# Patient Record
Sex: MALE | Race: WHITE | HISPANIC OR LATINO | Employment: FULL TIME | URBAN - METROPOLITAN AREA
[De-identification: names, ages, dates, MRNs, and addresses within clinical notes are randomized per-mention and may not be internally consistent; named-entity substitution may affect disease eponyms.]

---

## 2022-06-03 ENCOUNTER — EMPLOYEE HEALTH (OUTPATIENT)
Dept: OCCUPATIONAL MEDICINE | Facility: CLINIC | Age: 32
End: 2022-06-03
Payer: COMMERCIAL

## 2022-06-03 ENCOUNTER — EH NON-PROVIDER (OUTPATIENT)
Dept: OCCUPATIONAL MEDICINE | Facility: CLINIC | Age: 32
End: 2022-06-03
Payer: COMMERCIAL

## 2022-06-03 DIAGNOSIS — Z02.89 ENCOUNTER FOR OCCUPATIONAL HEALTH ASSESSMENT: Primary | ICD-10-CM

## 2022-06-03 DIAGNOSIS — Z02.89 ENCOUNTER FOR OCCUPATIONAL HEALTH ASSESSMENT: ICD-10-CM

## 2022-06-03 LAB
AMP AMPHETAMINE: NORMAL
BAR BARBITURATES: NORMAL
BZO BENZODIAZEPINES: NORMAL
COC COCAINE: NORMAL
INT CON NEG: NORMAL
INT CON POS: NORMAL
MDMA ECSTASY: NORMAL
MET METHAMPHETAMINES: NORMAL
MTD METHADONE: NORMAL
OPI OPIATES: NORMAL
OXY OXYCODONE: NORMAL
PCP PHENCYCLIDINE: NORMAL
POC URINE DRUG SCREEN OCDRS: NORMAL
THC: NORMAL

## 2022-06-03 PROCEDURE — 8915 PR COMPREHENSIVE PHYSICAL: Performed by: PREVENTIVE MEDICINE

## 2022-06-03 PROCEDURE — 94375 RESPIRATORY FLOW VOLUME LOOP: CPT | Performed by: PREVENTIVE MEDICINE

## 2022-06-03 PROCEDURE — 80305 DRUG TEST PRSMV DIR OPT OBS: CPT | Performed by: PREVENTIVE MEDICINE

## 2022-08-22 ENCOUNTER — EH NON-PROVIDER (OUTPATIENT)
Dept: OCCUPATIONAL MEDICINE | Facility: CLINIC | Age: 32
End: 2022-08-22
Payer: COMMERCIAL

## 2023-04-03 ASSESSMENT — RHEUMATOLOGY NEW PATIENT QUESTIONNAIRE
VISION LOSS: N
BLOODY CONSTIPATION: N
TINGLING: N
DIZZINESS: N
BLEEDING GUMS: N
CHILLS: N
HAIR LOSS WITH BALD SPOTS: N
JOINT SWELLING: N
PELVIC PAIN: N
SPASMS: Y
SINUS PAIN: Y
DIFFICULTY SPEAKING: N
THYROID PAIN: N
HEARING LOSS: N
EAR PAIN: N
DIFFICULTY SWALLOWING: N
RINGING IN EARS: N
CHIEF COMPLAINT: ABNORMAL LAB RESULTS
EYE REDNESS: N
GOITER: N
COUGH WITH BLOODY PHLEGM: N
VOMITING: N
FEVERS: N
DRY COUGH: N
HAIR SHEDDING: N
ANXIETY: N
BURNING URINATION: N
TEMPLE PAIN: N
BLOOD IN URINE: N
DRY EYES: N
MUSCLE PAIN: N
HEADACHES: Y
NIGHT SWEATS: N
ACHILLES TENDON PAIN: N
NUMBNESS: N
NOSEBLEEDS: Y
ABNORMAL DISCHARGE: N
NAUSEA: N
DRY MOUTH: N
NASAL ULCERS: N
CAVITIES: N
IRREGULAR BEATS: N
FROTHY URINE: N
NECK PAIN: N
SORE THROAT: N
PALPITATIONS: N
SHORTNESS OF BREATH: N
SNORING: N
JOINT PAIN: SAME WITH ACTIVITY
SKIN PLAQUES: N
GENITAL ULCERS: N
ORAL ULCERS: N
CHEST PAIN WITH BREATHING: N
SKIN THICKENING: N
MUSCLE WEAKNESS: N
BODY ACHES: Y
BLOODY DIARRHEA: N
EYE PAIN: N
ABDOMINAL PAIN: Y
VERTIGO: N
BLURRINESS: N
HEARTBURN: N
DEPRESSION: N
MORNING STIFFNESS: <30 MINS
SINONASAL CONGESTION: Y

## 2023-04-04 ENCOUNTER — OFFICE VISIT (OUTPATIENT)
Dept: RHEUMATOLOGY | Facility: MEDICAL CENTER | Age: 33
End: 2023-04-04
Attending: STUDENT IN AN ORGANIZED HEALTH CARE EDUCATION/TRAINING PROGRAM
Payer: COMMERCIAL

## 2023-04-04 VITALS
SYSTOLIC BLOOD PRESSURE: 118 MMHG | HEIGHT: 61 IN | DIASTOLIC BLOOD PRESSURE: 70 MMHG | OXYGEN SATURATION: 96 % | TEMPERATURE: 98.3 F | HEART RATE: 73 BPM | BODY MASS INDEX: 27.94 KG/M2 | WEIGHT: 148 LBS

## 2023-04-04 DIAGNOSIS — R74.8 ELEVATED LIVER ENZYMES: ICD-10-CM

## 2023-04-04 DIAGNOSIS — R76.8 SEROLOGIC AUTOIMMUNITY: ICD-10-CM

## 2023-04-04 DIAGNOSIS — M25.50 ARTHRALGIA OF MULTIPLE JOINTS: ICD-10-CM

## 2023-04-04 PROCEDURE — 99204 OFFICE O/P NEW MOD 45 MIN: CPT | Performed by: STUDENT IN AN ORGANIZED HEALTH CARE EDUCATION/TRAINING PROGRAM

## 2023-04-04 PROCEDURE — 99211 OFF/OP EST MAY X REQ PHY/QHP: CPT | Performed by: STUDENT IN AN ORGANIZED HEALTH CARE EDUCATION/TRAINING PROGRAM

## 2023-04-04 RX ORDER — FLUTICASONE PROPIONATE 50 MCG
SPRAY, SUSPENSION (ML) NASAL
COMMUNITY
Start: 2022-07-01

## 2023-04-04 RX ORDER — SUMATRIPTAN 50 MG/1
TABLET, FILM COATED ORAL
COMMUNITY
Start: 2022-07-01

## 2023-04-04 RX ORDER — HYDROCODONE BITARTRATE AND ACETAMINOPHEN 5; 325 MG/1; MG/1
TABLET ORAL
COMMUNITY
Start: 2023-01-23 | End: 2023-04-04

## 2023-04-04 NOTE — PROGRESS NOTES
[address], [address], [place] [address]  Phone: [phone] ? Fax: [phone]    RHEUMATOLOGY NEW PATIENT VISIT NOTE      DATE OF SERVICE: 04/04/2023         Subjective     REFERRING PRACTITIONER:  Tracey Chacon P.A.-C.  [address] [place] [address]    PATIENT IDENTIFICATION:  Sandeep Miranda  [address]  [place] [address]    YOB: 1990    MEDICAL RECORD NUMBER: [id number]         CHIEF COMPLAINT:   Chief Complaint   Patient presents with    New Patient     Elevated antinuclear antibody (MICHELLE) level       HISTORY OF PRESENT ILLNESS:  Sandeep Miranda is a 32 y.o. male with pertinent history notable for joint injuries in his youth (right shoulder dislocation, knees, and right ankle) s/p arthroscopies with resultant waxing/waning arthralgia, and chronic RUQ abdominal pain since early 2022 s/p cholecystectomy and liver biopsy 1/2023 (reportedly with unremarkable findings). Presents for rheumatologic evaluation in the setting of MICHELLE with concern for underlying connective tissue disease. Reports onset of symptoms in 2/2023 with right lower back pain in addition to his history of waxing/waning arthralgia in his shoulders, right upper back, knees, and right ankle, but otherwise doing quite well. Reports other aspects of symptoms and medical history as noted on the questionnaire below.    Pertinent laboratory results: Borderline positive MICHELLE 1:40 nuclear homogenous/speckled patterns (in 7/2022), borderline positive RF 16 with negative anti-CCP (in 9/2022), elevated AST 50 and ALT 76 with normal ALP (in 7/2022); negative/normal HAV, HBV, and HCV (in 9/2021), AMA, alpha-1-antitrypsin, and ceruloplasmin (in 7/2022), anti-dsDNA, anti-SSA, anti-SSB, anti-TPO, and TSH (in 10/2022), and unremarkable CBC (in 7/2022).    [facility] [place] [facility] [place]    4/3/2023  1:54 PM PDT - Filed by Patient   Patient Information   Occupation: RN   Highest Level of  Education: Bachelor's   Chief Complaint Abnormal lab results   History of Present Illness   Date of symptom onset (month/year):    Preceding incident/ailment:    Describe/list your symptoms: Joint pain in shoulders, right upper back, left lower back, knees, and right ankle   Aggravating factors:    Alleviating factors:    Helpful medications:    Ineffective medications:    Symptom severity/impact (scale of 1-10):    Personal/emotional stressors:    [place] The Locations Of Pain    REVIEW OF SYSTEMS    General   Fevers No   Chills No   Night sweats No   Unintentional Weight Loss None   Musculoskeletal   Joint pain Same with activity   Morning stiffness <30 mins   Joint swelling No   Achilles tendon pain No   Muscle pain No   Body Aches Yes   Dermatologic   Hair loss with bald spots No   Hair shedding No   Sunlight-induced skin rash    Skin thickening No   Skin plaques No   Cold-induced color changes (white, purple, red on rewarming)    Neurologic/Psychiatric   Muscle weakness No   Spasms Yes   Tingling No   Numbness No   Anxiety No   Depression No   Head/Eyes   Headaches Yes   Temple pain No   Dizziness No   Dry eyes No   Eye pain No   Eye redness No   Blurriness No   Vision loss No   Ears/Nose   Ear pain No   Ringing in ears No   Vertigo No   Hearing loss No   Nasal ulcers No   Nosebleeds Yes   Sinus pain Yes   Sinonasal congestion Yes   Snoring No   Mouth/Throat   Oral ulcers No   Bleeding gums No   Dry mouth No   Cavities No   Sore throat No   Difficulty speaking No   Difficulty swallowing No   Neck/Lymphatics   Neck pain No   Thyroid pain No   Goiter No   Swollen Glands    Cardiac/Respiratory   Chest pain with breathing No   Dry cough No   Cough with bloody phlegm No   Shortness of breath No   Palpitations No   Irregular beats No   Gastrointestinal   Nausea No   Vomiting No   Heartburn No   Abdominal pain Yes   Bloody diarrhea No   Bloody constipation No   Genitourinary   Pelvic Pain No   Genital ulcers No    Abnormal discharge No   Burning urination No   Frothy urine No   Blood in urine No   Medical/Personal History Details   Current Medical Problems:    Past/Resolved Medical Problems: Cholecystitis   Surgeries/Procedures (include month/year): Left knee surgery, right knee surgery, left shoulder labrum repair, OATS procedure of right ankle, with graft from right femur, cholecystectomy with liver biopsy.   Prescription/Over-The-Counter/Herbal Medications:    Medication/Food/Material Allergies (include reactions):    Immunizations (include month/year)    Alcohol/Tobacco/Recreational Drugs: Occasionally drink   Family Medical History (only first-degree relatives): Diabetes       ACTIVE PROBLEM LIST:  Patient Active Problem List    Diagnosis Date Noted    Serologic autoimmunity (positive MICHELLE and RF) 04/04/2023    Arthralgia of multiple joints 04/04/2023    Elevated liver enzymes 04/04/2023     PAST MEDICAL HISTORY:  No past medical history on file.    PAST SURGICAL HISTORY:  No past surgical history on file.    MEDICATIONS:  Current Outpatient Medications   Medication Sig    fluticasone (FLONASE) 50 MCG/ACT nasal spray     SUMAtriptan (IMITREX) 50 MG Tab        ALLERGIES:   No Known Allergies    IMMUNIZATIONS:  Immunization History   Administered Date(s) Administered    Influenza (IM) Preservative Free - HISTORICAL DATA 12/10/2012    Influenza Vaccine Quad Inj (Pf) 09/29/2015, 10/07/2020, 10/12/2021    MMR Vaccine 01/20/2009    PFIZER BIVALENT BOOSTER SARS-COV-2 VACCINE (12+) 01/05/2023    PFIZER PURPLE CAP SARS-COV-2 VACCINATION (12+) 04/15/2021, 05/06/2021, 01/07/2022    Tdap Vaccine 09/29/2015, 11/05/2018    Tuberculin Skin Test 01/12/2009       SOCIAL HISTORY:   Social History     Socioeconomic History    Marital status:    Tobacco Use    Smoking status: Never    Smokeless tobacco: Never       FAMILY HISTORY:  No family history on file.         Objective     Vital Signs: /70 (BP Location: Left arm,  "Patient Position: Sitting, BP Cuff Size: Adult)   Pulse 73   Temp 36.8 °C (98.3 °F) (Temporal)   Ht 1.549 m (5' 1\")   Wt 67.1 kg (148 lb)   SpO2 96% Body mass index is 27.96 kg/m².    General: Appears well and comfortable  Eyes: No scleral or conjunctival lesions  ENT: No apparent oral or nasal lesions  Head/Neck: No apparent scalp or neck lesions  Cardiovascular: Regular rate and rhythm; no pericardial rubs  Respiratory: Breathing quiet and unlabored; no rales or pleural rubs  Gastrointestinal: No apparent organomegaly or abdominal masses  Integumentary: No significant cutaneous lesions or discolorations  Musculoskeletal: No significant joint tenderness, periarticular soft tissue swelling, warmth, erythema, or overt signs of synovitis; no significant restriction in range of motion of joints examined  Neurologic: No focal sensory or motor deficits  Psychiatric: Mood and affect appropriate      LABORATORY RESULTS REVIEWED AND INTERPRETED BY ME:  Lab Results   Component Value Date/Time    HEPBSAG NON-REACTIVE 09/29/2021 07:51 AM    HEPBCORTOT Non-reactive 09/29/2021 07:51 AM       RADIOLOGY RESULTS REVIEWED AND INTERPRETED BY ME:  No loadable results found in the system. Pertinent non-system results summarized under history above.      All relevant laboratory and imaging results reported on this note were reviewed and interpreted by me.         Assessment & Plan     Sandeep Miranda is a 32 y.o. male with history as noted above whose presentation merits the following diagnostic and clinical status impressions and recommendations:    1. Serologic autoimmunity (positive MICHELLE and RF)  Serologic evidence of immunologic activity without objective clinical evidence of an underlying autoimmune connective tissue disease, such as systemic lupus or rheumatoid arthritis. Notably, the MICHELLE test is highly sensitive and lacks diagnostic specificity as it can be seen in a variety of non-rheumatic conditions, such as acute or " chronic viral or bacterial infections, autoimmune thyroid disease (Hashimoto's thyroiditis or Graves' disease), autoimmune hepatobiliary disease, inflammatory bowel disease, and lymphoproliferative disease or malignancy, as well as in over 10% of healthy individuals with no clinical evidence of autoimmune rheumatic disease. Though positive RF may be associated with rheumatoid arthritis, in this case the level is low and is considered to be clinically insignificant as it may actually be a false positive. Notably, aside from rheumatoid arthritis, positive RF may be detected in some individuals with certain viral or bacterial infections, inflammatory lung disease, primary biliary cholangitis, B-cell lymphomas, and in some healthy individuals, so does not always suggest the presence of an underlying rheumatoid arthritis. All this said, the possibility of evolving connective tissue disease cannot be entirely excluded, so if suspicious symptoms develop and persist, clinical follow-up for re-evaluation would be reasonable. For now, reasonable to undertake the additional workup noted below for exclusionary and risk stratification purposes.  - ANTI-HISTONE ABS; Future  - CHROMATIN AB,IGG; Future  - RIBOSOMAL P AB; Future    2. Arthralgia of multiple joints  Presentation is compatible with biomechanical arthralgia secondary to osteoarthritis or myofascial pain, but the possibility of concomitant evolving low-grade inflammatory arthritis cannot be entirely excluded. In any case, reasonable to undertake further investigation with the workup noted below for exclusionary and risk stratification purposes.  - HLA-B27; Future  - Sed Rate; Future  - CRP QUANTITIVE (NON-CARDIAC); Future    3. Elevated liver enzymes  Etiology unclear but presently with no definitive evidence of an underlying autoimmune cause, in the context of his serologic autoimmunity, that possibility cannot be entirely excluded.  - Additional work-up as noted  above      Note: The above assessment and plan were discussed with the patient who acknowledged understanding of the plan.    FOLLOW-UP: Return for follow-up TBD.         Thank you for the opportunity to participate in the care of Sandeep Miranda.    Rehan Browning MD, MS  Rheumatologist, [facility] ? [facility]   of Clinical Medicine, Department of Internal Medicine  Critical access hospital ? [facility]

## 2023-04-04 NOTE — PATIENT INSTRUCTIONS
AFTER VISIT INSTRUCTIONS    Below are important information to help you navigate your healthcare needs and help us serve you safely and effectively:  If laboratory tests and/or imaging studies were ordered, remember to go get them done as instructed.  If new prescriptions and/or refills were sent, remember to go pick them up from your local pharmacy, or call the specialty pharmacy to request shipment.  Always take your prescription medications exactly as prescribed unless instructed otherwise.  Note that antirheumatic drugs and steroids are immunosuppressive which means they increase your risk of infections and have multiple potential adverse effects on various organ systems in your body, though most of them are uncommon.  It is important that you are up-to-date on age-appropriate immunizations, particularly shingles and bacterial/viral pneumonia vaccines, which you can request from me or your primary care provider.  Be sure to read the drug package inserts to learn about the potential side effects of your medications before you start taking them.  If you experience any significant drug side effects, stop taking the medication and notify me promptly, and depending on the severity of the side effects, consider going to an urgent care or emergency department for immediate attention.  If there are significant findings on your lab tests and imaging studies that warrant further action, I will notify you with explanations via [employer] or phone call, otherwise you can view them on [employer] and let me know if you have any questions.  Note that [employer] messages are typically read during office hours and may take 1-7 business days before a response depending on the urgency of the situation and how busy my clinic schedule is.  In general, [employer] messaging is for non-urgent matters that do not require immediate attention, so for urgent matters that cannot wait, you are advised to go to an urgent care.  Lastly, you are granted  MyChart access to my documentation of your visit and are encouraged to read my note which details my assessment and plan for your condition.